# Patient Record
(demographics unavailable — no encounter records)

---

## 2025-05-30 NOTE — ASSESSMENT
[FreeTextEntry1] : PE: Const: Awake, alert, oriented Eyes: EOMI. sclera without erythema or injection Resp: Even , unlabored. no increased WOB. No cough appreciated Breasts: minimal breast growth and negligible feminization of breasts .no masses, nodules , nipple discharge Neck: no JVD Neuro: Nl Tone Psych: Nl Affect Skin: no rashes/lesions on visible skin HEENT: elongated forehead Supraorbital fullness convex frontal sinus Masculine zygoma shape/hypoplastic cheeks hypertrophic masseter muscles, active and prominent widened, boxy, full chin   plan for medically necessary facial feminization to alleviate symptoms of gender dysphoria  plan: 3dct scan for virtual surgical planning forehead reduction, bicoronal cpt 94133 supraorbital reduction cpt 20127-91 lateral orbital reconstruction 87902 fat grafting to zygoma, staged procedure - cpt 13711 genioplasty, reductive cpt 82404   to do: frontal sinus modification cpt 06121 bilateral masseteric muscle resection 82092-46   We will need two letters of support in accordance with [place] Medicaid guidelines. Patient will work on obtaining these from PCP/mental health provider/endocrinologist.  I, Dr. Quintana, personally performed the evaluation and management (E/M) services for this new patient. That E/M includes conducting the clinically appropriate initial history &/or exam, assessing all conditions, and establishing the plan of care. Today, my BILLY, Faizan Clemens PA-C, was here to observe my evaluation and management service for this patient & follow plan of care established by me going forward.

## 2025-05-30 NOTE — ASSESSMENT
[FreeTextEntry1] : PE: Const: Awake, alert, oriented Eyes: EOMI. sclera without erythema or injection Resp: Even , unlabored. no increased WOB. No cough appreciated Breasts: minimal breast growth and negligible feminization of breasts .no masses, nodules , nipple discharge Neck: no JVD Neuro: Nl Tone Psych: Nl Affect Skin: no rashes/lesions on visible skin HEENT: elongated forehead Supraorbital fullness convex frontal sinus Masculine zygoma shape/hypoplastic cheeks hypertrophic masseter muscles, active and prominent widened, boxy, full chin   plan for medically necessary facial feminization to alleviate symptoms of gender dysphoria  plan: 3dct scan for virtual surgical planning forehead reduction, bicoronal cpt 99139 supraorbital reduction cpt 92927-33 lateral orbital reconstruction 42623 fat grafting to zygoma, staged procedure - cpt 87254 genioplasty, reductive cpt 62054   to do: frontal sinus modification cpt 21317 bilateral masseteric muscle resection 78144-08   We will need two letters of support in accordance with [place] Medicaid guidelines. Patient will work on obtaining these from PCP/mental health provider/endocrinologist.  I, Dr. Quintana, personally performed the evaluation and management (E/M) services for this new patient. That E/M includes conducting the clinically appropriate initial history &/or exam, assessing all conditions, and establishing the plan of care. Today, my BILLY, Faizan Clemens PA-C, was here to observe my evaluation and management service for this patient & follow plan of care established by me going forward.

## 2025-05-30 NOTE — HISTORY OF PRESENT ILLNESS
[FreeTextEntry1] : 29 year old male to female transgender patient with a history of gender dysphoria. She seeks consultation for facial feminization surgery and is followed by a [facility] Transgender team. She reports that she has been socially transitioning since 10 years ago. She has been medically transitioning since 10 years ago. She reports significant mental health history of anxiety and depression but no history of psychiatric hospitalizations. Her past surgical history includes BA done in 2016, rhinoplasty done in 2017 and vaginopalsty done in 2018. She is currently in transgender care at [facility] transgender center. She denies cigarette use, drinks some alcohol, and occasionally smokes marijuana.  Patient denies gender affirming procedures such as Botox, silicone, fillers, liposuction and fat grafting. Patient denies personal and family medical history of clots, strokes, bleeding disorders and anesthesia problems. She reports that the male appearance of her face exacerbate her gender dysphoria and cause misgendering.

## 2025-05-30 NOTE — HISTORY OF PRESENT ILLNESS
[FreeTextEntry1] : 29 year old male to female transgender patient with a history of gender dysphoria. She seeks consultation for facial feminization surgery and is followed by a [facility] Transgender team. She reports that she has been socially transitioning since 10 years ago. She has been medically transitioning since 10 years ago. She reports significant mental health history of anxiety and depression but no history of psychiatric hospitalizations. Her past surgical history includes BA done in 2016, rhinoplasty done in 2017 and vaginopalsty done in 2018. She is currently in transgender care at [facility] transgender center. She denies cigarette use, drinks some alcohol, and occasionally smokes marijuana.  Patient denies gender affirming procedures such as Botox, silicone, fillers, liposuction and fat grafting. Patient denies personal and family medical history of clots, strokes, bleeding disorders and anesthesia problems. She reports that the male appearance of her face exacerbate her gender dysphoria and cause misgendering.